# Patient Record
Sex: MALE | Race: ASIAN | NOT HISPANIC OR LATINO | Employment: FULL TIME | URBAN - METROPOLITAN AREA
[De-identification: names, ages, dates, MRNs, and addresses within clinical notes are randomized per-mention and may not be internally consistent; named-entity substitution may affect disease eponyms.]

---

## 2017-06-26 ENCOUNTER — OFFICE VISIT - HEALTHEAST (OUTPATIENT)
Dept: FAMILY MEDICINE | Facility: CLINIC | Age: 22
End: 2017-06-26

## 2017-06-26 DIAGNOSIS — N50.9 TESTICULAR ABNORMALITY: ICD-10-CM

## 2017-06-26 ASSESSMENT — MIFFLIN-ST. JEOR: SCORE: 1334.97

## 2017-06-27 ENCOUNTER — COMMUNICATION - HEALTHEAST (OUTPATIENT)
Dept: FAMILY MEDICINE | Facility: CLINIC | Age: 22
End: 2017-06-27

## 2017-07-05 ENCOUNTER — HOSPITAL ENCOUNTER (OUTPATIENT)
Dept: ULTRASOUND IMAGING | Facility: HOSPITAL | Age: 22
Discharge: HOME | End: 2017-07-05
Attending: FAMILY MEDICINE

## 2017-07-05 DIAGNOSIS — N50.9 TESTICULAR ABNORMALITY: ICD-10-CM

## 2017-07-12 ENCOUNTER — OFFICE VISIT - HEALTHEAST (OUTPATIENT)
Dept: FAMILY MEDICINE | Facility: CLINIC | Age: 22
End: 2017-07-12

## 2017-07-12 DIAGNOSIS — M54.6 BILATERAL THORACIC BACK PAIN, UNSPECIFIED CHRONICITY: ICD-10-CM

## 2017-07-12 ASSESSMENT — MIFFLIN-ST. JEOR: SCORE: 1341.74

## 2018-08-09 ENCOUNTER — OFFICE VISIT - HEALTHEAST (OUTPATIENT)
Dept: FAMILY MEDICINE | Facility: CLINIC | Age: 23
End: 2018-08-09

## 2018-08-09 DIAGNOSIS — Z98.890 H/O RIGHT INGUINAL HERNIA REPAIR: ICD-10-CM

## 2018-08-09 DIAGNOSIS — N50.811 PAIN IN RIGHT TESTICLE: ICD-10-CM

## 2018-08-09 DIAGNOSIS — Z23 IMMUNIZATION DUE: ICD-10-CM

## 2018-08-09 DIAGNOSIS — Z87.19 H/O RIGHT INGUINAL HERNIA REPAIR: ICD-10-CM

## 2018-08-09 ASSESSMENT — MIFFLIN-ST. JEOR: SCORE: 1317.05

## 2018-08-10 ENCOUNTER — HOSPITAL ENCOUNTER (OUTPATIENT)
Dept: ULTRASOUND IMAGING | Facility: HOSPITAL | Age: 23
Discharge: HOME | End: 2018-08-10
Attending: FAMILY MEDICINE

## 2018-08-10 DIAGNOSIS — N50.811 PAIN IN RIGHT TESTICLE: ICD-10-CM

## 2018-08-21 ENCOUNTER — COMMUNICATION - HEALTHEAST (OUTPATIENT)
Dept: FAMILY MEDICINE | Facility: CLINIC | Age: 23
End: 2018-08-21

## 2019-12-04 ENCOUNTER — OFFICE VISIT - HEALTHEAST (OUTPATIENT)
Dept: FAMILY MEDICINE | Facility: CLINIC | Age: 24
End: 2019-12-04

## 2019-12-04 DIAGNOSIS — R10.31 RLQ ABDOMINAL PAIN: ICD-10-CM

## 2019-12-04 DIAGNOSIS — T81.9XXS: ICD-10-CM

## 2019-12-04 DIAGNOSIS — R63.6 UNDERWEIGHT: ICD-10-CM

## 2019-12-04 RX ORDER — ACETAMINOPHEN 500 MG
1000 TABLET ORAL 2 TIMES DAILY PRN
Qty: 100 TABLET | Refills: 2 | Status: SHIPPED | OUTPATIENT
Start: 2019-12-04 | End: 2024-04-05

## 2019-12-04 ASSESSMENT — MIFFLIN-ST. JEOR: SCORE: 1323.52

## 2019-12-05 ENCOUNTER — COMMUNICATION - HEALTHEAST (OUTPATIENT)
Dept: FAMILY MEDICINE | Facility: CLINIC | Age: 24
End: 2019-12-05

## 2020-06-01 ENCOUNTER — VIRTUAL VISIT (OUTPATIENT)
Dept: FAMILY MEDICINE | Facility: OTHER | Age: 25
End: 2020-06-01

## 2020-06-02 NOTE — PROGRESS NOTES
"Date: 2020 08:33:42  Clinician: Luiza Tyson  Clinician NPI: [id number]  Patient: Paul Fulton  Patient : 1995  Patient Address: [address], [place] [address]  Patient Phone: [phone]  Visit Protocol: URI  Patient Summary:  Paul is a 24 year old ( : 1995 ) male who initiated a Visit for COVID-19 (Coronavirus) evaluation and screening. When asked the question \"Please sign me up to receive news, health information and promotions. \", Paul responded \"No\".    Paul states his symptoms started 1-2 days ago.   His symptoms consist of ageusia, a sore throat, malaise, myalgia, anosmia, a cough, nasal congestion, rhinitis, a headache, and chills. Paul also feels feverish.   Symptom details     Nasal secretions: The color of his mucus is yellow.    Cough: Paul coughs every 5-10 minutes and his cough is more bothersome at night. Phlegm does not come into his throat when he coughs. He does not believe his cough is caused by post-nasal drip.     Sore throat: Paul reports having severe throat pain (7-9 on a 10 point pain scale), does not have exudate on his tonsils, and can swallow liquids. He is not sure if the lymph nodes in his neck are enlarged. A rash has not appeared on the skin since the sore throat started.     Temperature: His current temperature is 99.0 degrees Fahrenheit.     Headache: He states the headache is severe (7-9 on a 10 point pain scale).      Paul denies having wheezing, nausea, teeth pain, diarrhea, facial pain or pressure, vomiting, and ear pain. He also denies having recent facial or sinus surgery in the past 60 days, taking antibiotic medication for the symptoms, and having a sinus infection within the past year. He is not experiencing dyspnea.   Precipitating events  Within the past week, Paul has not been exposed to someone with strep throat. He has recently been exposed to someone with influenza. Paul has not been in close contact with any high risk individuals.   Pertinent " COVID-19 (Coronavirus) information  In the past 14 days, Paul has not worked in a congregate living setting.   He does not work or volunteer as healthcare worker or a  and does not work or volunteer in a healthcare facility.   Paul also has not lived in a congregate living setting in the past 14 days. He does not live with a healthcare worker.   Paul has not had a close contact with a laboratory-confirmed COVID-19 patient within 14 days of symptom onset.   Pertinent medical history  Paul needs a return to work/school note.   Weight: 110 lbs   Paul does not smoke or use smokeless tobacco.   Weight: 110 lbs    MEDICATIONS: No current medications, ALLERGIES: NKDA  Clinician Response:  Dear Paul,      Your symptoms show that you may have coronavirus (COVID-19). This illness can cause fever, cough and trouble breathing. Many people get a mild case and get better on their own. Some people can get very sick.  Will I be tested for COVID-19?  Because we have limited testing supplies we are not testing everyone if they are low risk. We are testing if:   You are very ill. For example, you're on chemotherapy, dialysis or home hospice care. (Contact your specialty clinic or program.)   You live in a nursing home or other long-term care facility. (Talk to your nurse manager or medical director.)   You're a health care worker. ([facility] employees Contact our employee health office for testing.)   We are performing limited curbside testing for healthcare/first responders and people with medical problems that put them at increased risk. It does not appear by the [facility] information you submitted that you meet any of these criteria. If there are medical problems that we did not know about, please repeat an [facility] visit and let us know what medical conditions you have.  How can I protect others?  Without a test, we can't know for sure that you have COVID-19. For safety, it's very important to follow these rules.   First, stay home and away from others (self-isolate) until:   You've had no fever---and no medicine that reduces fever---for 3 full days (72 hours). And...    Your other symptoms have gotten better. For example, your cough or breathing has improved. And...   At least 10 days have passed since your symptoms started.   During this time:   Don't go to work, school or anywhere else.    Stay away from others in your home. No hugging, kissing or shaking hands.   Don't let anyone visit.   Cover your mouth and nose with a mask, tissue or wash cloth to avoid spreading germs.   Wash your hands and face often. Use soap and water.   How can I take care of myself?  1.Take Tylenol (acetaminophen) for fever or pain. If you have liver or kidney problems, ask your family doctor if it's okay to take Tylenol.   Adults can take either:    650 mg (two 325 mg pills) every 4 to 6 hours, or...   1,000 mg (two 500 mg pills) every 8 hours as needed.    Note: Don't take more than 3,000 mg in one day.  For children, check the Tylenol bottle for the right dose. The dose is based on the child's age or weight.   2.If you have other health problems (like cancer, heart failure, an organ transplant or severe kidney disease): Call your specialty clinic if you don't feel better in the next 2 days.  3.Know when to call 911: If your breathing is so bad that it keeps you from doing normal activities, call 911 or go to the emergency room. Tell them that you've been staying home and may have COVID-19.  4.Sign up for [employer]. We know it's scary to hear that you might have COVID-19. We want to track your symptoms to make sure you're okay over the next 2 weeks. Please look for an email from [employer]---this is a free, online program that we'll use to keep in touch. To sign up, follow the link in the email. Learn more at [email or web address].  Where can I get more information?  To learn more about COVID-19 and how to care for yourself at  home, please visit the CDC website at https://www.cdc.gov/coronavirus/2019-ncov/about/steps-when-sick.html.  For more options for care at [facility], please visit our website at [email or web address]   If you are interested in becoming part of a [facility] clinic trial related to COVID19 please go to [email or web address] for information, if you qualify.     Diagnosis: Cough  Diagnosis ICD: R05

## 2020-06-30 ENCOUNTER — COMMUNICATION - HEALTHEAST (OUTPATIENT)
Dept: SCHEDULING | Facility: CLINIC | Age: 25
End: 2020-06-30

## 2020-07-01 ENCOUNTER — OFFICE VISIT - HEALTHEAST (OUTPATIENT)
Dept: FAMILY MEDICINE | Facility: CLINIC | Age: 25
End: 2020-07-01

## 2020-07-01 ENCOUNTER — COMMUNICATION - HEALTHEAST (OUTPATIENT)
Dept: FAMILY MEDICINE | Facility: CLINIC | Age: 25
End: 2020-07-01

## 2020-07-01 DIAGNOSIS — M25.512 ACUTE PAIN OF LEFT SHOULDER: ICD-10-CM

## 2020-07-01 ASSESSMENT — MIFFLIN-ST. JEOR: SCORE: 1345.07

## 2021-02-26 ENCOUNTER — OFFICE VISIT - HEALTHEAST (OUTPATIENT)
Dept: FAMILY MEDICINE | Facility: CLINIC | Age: 26
End: 2021-02-26

## 2021-02-26 DIAGNOSIS — K21.9 GASTROESOPHAGEAL REFLUX DISEASE WITHOUT ESOPHAGITIS: ICD-10-CM

## 2021-02-26 DIAGNOSIS — Z23 NEED FOR IMMUNIZATION AGAINST INFLUENZA: ICD-10-CM

## 2021-02-26 RX ORDER — FAMOTIDINE 20 MG/1
20 TABLET, FILM COATED ORAL 2 TIMES DAILY
Qty: 60 TABLET | Refills: 1 | Status: SHIPPED | OUTPATIENT
Start: 2021-02-26 | End: 2022-04-25

## 2021-02-26 ASSESSMENT — MIFFLIN-ST. JEOR: SCORE: 1338.4

## 2021-03-02 ENCOUNTER — AMBULATORY - HEALTHEAST (OUTPATIENT)
Dept: LAB | Facility: CLINIC | Age: 26
End: 2021-03-02

## 2021-03-02 DIAGNOSIS — K21.9 GASTROESOPHAGEAL REFLUX DISEASE WITHOUT ESOPHAGITIS: ICD-10-CM

## 2021-03-04 LAB — H PYLORI AG STL QL IA: POSITIVE

## 2021-05-19 ENCOUNTER — OFFICE VISIT - HEALTHEAST (OUTPATIENT)
Dept: FAMILY MEDICINE | Facility: CLINIC | Age: 26
End: 2021-05-19

## 2021-05-19 DIAGNOSIS — M79.18 MUSCULAR ABDOMINAL PAIN IN RIGHT LOWER QUADRANT: ICD-10-CM

## 2021-05-19 RX ORDER — CELECOXIB 200 MG/1
200 CAPSULE ORAL 2 TIMES DAILY
Qty: 60 CAPSULE | Refills: 2 | Status: SHIPPED | OUTPATIENT
Start: 2021-05-19 | End: 2023-03-03

## 2021-05-31 VITALS — BODY MASS INDEX: 19.14 KG/M2 | WEIGHT: 104 LBS | HEIGHT: 62 IN

## 2021-05-31 VITALS — HEIGHT: 62 IN | BODY MASS INDEX: 18.96 KG/M2 | WEIGHT: 103.04 LBS

## 2021-06-01 VITALS — HEIGHT: 62 IN | BODY MASS INDEX: 18.14 KG/M2 | WEIGHT: 98.56 LBS

## 2021-06-03 VITALS
DIASTOLIC BLOOD PRESSURE: 58 MMHG | RESPIRATION RATE: 16 BRPM | HEART RATE: 68 BPM | SYSTOLIC BLOOD PRESSURE: 96 MMHG | BODY MASS INDEX: 18.63 KG/M2 | WEIGHT: 101.25 LBS | TEMPERATURE: 98.9 F | HEIGHT: 62 IN

## 2021-06-04 VITALS
DIASTOLIC BLOOD PRESSURE: 68 MMHG | SYSTOLIC BLOOD PRESSURE: 96 MMHG | WEIGHT: 102.5 LBS | OXYGEN SATURATION: 97 % | RESPIRATION RATE: 16 BRPM | HEART RATE: 77 BPM | BODY MASS INDEX: 18.16 KG/M2 | TEMPERATURE: 98.4 F | HEIGHT: 63 IN

## 2021-06-04 NOTE — TELEPHONE ENCOUNTER
The pain is not better. Patient notified that Dr. Bassett office will be calling him to set up an evaluation. Thanks.

## 2021-06-04 NOTE — PROGRESS NOTES
OFFICE VISIT NOTE      Assessment & Plan   Paul Fulton is a 24 y.o. male who has burning pain around his hernia repair on the RLQ. He is frustrated not to have been told his u/s results. His overall picture is uncertain to me, but it is correct for him to be seeking relief from the pain!  He has not been taking pain medications. I want to see if he takes an anti-inflammatory if the pain improves at all. If it doesn't, he will need other testing.    Take meds  Self-monitor  Call in 1 week - see how he's doing. Call after 2pm, call 2nd mobile #    Diagnoses and all orders for this visit:    Surgical site reaction, sequela    RLQ abdominal pain  -     ibuprofen (ADVIL,MOTRIN) 800 MG tablet; Take 1 tablet (800 mg total) by mouth 2 (two) times a day as needed for pain.  Dispense: 60 tablet; Refill: 2  -     acetaminophen (TYLENOL EXTRA STRENGTH) 500 MG tablet; Take 2 tablets (1,000 mg total) by mouth 2 (two) times a day as needed for pain.  Dispense: 100 tablet; Refill: 2    Underweight        Netta Yan MD    The following low BMI interventions were performed this visit: weight gain advised          Subjective:   Chief Complaint:  Follow-up (would like results from ultrasound done in 08/2018, continues to have pain)    24 y.o. male.     1) 2-3 weeks of intermittent burning pain in RLQ, in the area of the hernia surgeon done 2013  He has acetaminophen - but that does not help much    He rates the pain at 8-9/10 when it is bad, but he notes the pain is always there to some degree    When the RLQ pain is bad, the right leg becomes weak and cold (he noted this specific part of the complaint at the very end of the appt)    Works as for [employer] - does not lift extremely heavy things  At the time of the appointment, the pain is not very bad    Current Outpatient Medications   Medication Sig     acetaminophen (TYLENOL EXTRA STRENGTH) 500 MG tablet Take 2 tablets (1,000 mg total) by mouth 2 (two) times a day as  "needed for pain.     ibuprofen (ADVIL,MOTRIN) 800 MG tablet Take 1 tablet (800 mg total) by mouth 2 (two) times a day as needed for pain.       PSFHx: appropriate sections of PMH completed/filled in   Tobacco Status:  He  reports that he quit smoking about 3 years ago. His smoking use included cigarettes. He has never used smokeless tobacco.    Review of Systems:  No fever.  No rash. All other systems negative except as noted above.    Objective:    BP 96/58   Pulse 68   Temp 98.9  F (37.2  C) (Oral)   Resp 16   Ht 5' 2\" (1.575 m)   Wt 101 lb 4 oz (45.9 kg)   BMI 18.52 kg/m    GENERAL: No acute distress, calm, pleasant  Abd: +BS, soft, ND/tender in RLQ near the surgical scar which is well-healed, non-erythematous but protrudes some from under the skin (approx 3mm); with palpation on the LLQ he has more pain on the right; no pain in the upper quadrants; no pain with lifting the right leg while he is lying down    Spent 25 min face to face with patient with more the 50% spent in counseling, education and coordination of care and discussing pain mgmt, activity, etc.    "

## 2021-06-04 NOTE — TELEPHONE ENCOUNTER
Please call Paul Fulton on 12/11 after 2pm and ask how his burning pain is, with taking the ibuprofen?    (only say the following if the pain is not better)  If it is not better, then Dr. Indio Bassett, who did the hernia surgery in 2013 will see and examine him. We'll have the surgery dept call him to set up appt.

## 2021-06-05 VITALS
SYSTOLIC BLOOD PRESSURE: 102 MMHG | WEIGHT: 101.75 LBS | HEART RATE: 60 BPM | OXYGEN SATURATION: 99 % | DIASTOLIC BLOOD PRESSURE: 68 MMHG | HEIGHT: 63 IN | TEMPERATURE: 98.4 F | BODY MASS INDEX: 18.03 KG/M2
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
## 2021-06-09 NOTE — TELEPHONE ENCOUNTER
Pt called in states he was had shoulder pain couple of days ago.  The Pt states the pain is gone now.  The caller states his work place ask him to bring a paper from the Dr.  Appointment is made for the Pt.      Andres Antonio RN, [facility] Triage/Med Refill 6/30/2020 3:27 PM

## 2021-06-09 NOTE — PROGRESS NOTES
Assessment: /    Plan:    1. Acute pain of left shoulder         Note written to be off work 6/29 - 7/1.  Continue massage.  Heat.  Call if any problem.      Subjective:    HPI:  Paul Fulton is a 24-year-old male presnting with left shoulder pain.  It is located in the trapezius muscle area.  He awoke with the pain on 6/28.  No injury.  Massage helps.      Review of Systems:  No fever or cough.      Current Outpatient Medications   Medication Sig Dispense Refill     acetaminophen (TYLENOL EXTRA STRENGTH) 500 MG tablet Take 2 tablets (1,000 mg total) by mouth 2 (two) times a day as needed for pain. 100 tablet 2     ibuprofen (ADVIL,MOTRIN) 800 MG tablet Take 1 tablet (800 mg total) by mouth 2 (two) times a day as needed for pain. 60 tablet 2     No current facility-administered medications for this visit.          Objective:    Vitals:    07/01/20 0937   BP: 96/68   Pulse: 77   Resp: 16   Temp: 98.4  F (36.9  C)   SpO2: 97%       Gen:  NAD, VSS  Lungs:  normal  Heart:  normal  Left shoulder is normal except for trapezius tenderness.        ADDITIONAL HISTORY SUMMARIZED (2): None.  DECISION TO OBTAIN EXTRA INFORMATION (1): None.   RADIOLOGY TESTS (1): None.  LABS (1): None.  MEDICINE TESTS (1): None.  INDEPENDENT REVIEW (2 each): None.     Total Data Points: 0

## 2021-06-11 NOTE — PROGRESS NOTES
"  Chief Complaint   Patient presents with     Hernia     Pain         HPI:   Paul Fulton is a 21 y.o. male with  c/o right inguinal hernia recurrence.  Repaired in 2013.  He thinks there is something wrong with the mesh.  He has noted increased bulging around the testicle.  He also c/o back pain.  He stated this started two weeks ago, but chart review shows he was in in 8/2016 with similar c/o  Patient saw surgery who thought he had a hydrocoele and referred to urology.  Urology said normal exam and felt he had an entrapment of ilioiguinal nerve.  They referred him to the pain clinic for an injection, but he was never seen at the pain clinic.  No urinary symptoms.     ROS:  A 12 point comprehensive review of systems was negative except as noted.     Medications:  No current outpatient prescriptions on file prior to visit.     No current facility-administered medications on file prior to visit.          Social History:  Social History   Substance Use Topics     Smoking status: Current Some Day Smoker     Types: Cigarettes     Smokeless tobacco: Not on file      Comment: 1 cigarret ocasinal     Alcohol use 1.2 - 1.8 oz/week     2 - 3 Cans of beer per week      Comment: ones a months.         Physical Exam:   Vitals:    06/26/17 1934   BP: 112/64   Patient Site: Left Arm   Patient Position: Sitting   Cuff Size: Adult Regular   Pulse: 86   Temp: 97.7  F (36.5  C)   TempSrc: Oral   SpO2: 97%   Weight: 103 lb 0.6 oz (46.7 kg)   Height: 5' 2.21\" (1.58 m)       GEN:  NAD  :  Penis normal.  Testes both descended.  Cystic type structure, mildly tender above the right testicle.  Well healed right inguinal scar.  Mild tenderness over this with coughing.  Suggestion of a bulge with coughing.        Assessment/Plan:    1. Testicular abnormality        He has a cystic type structure above the right testicle that apparently wasn't on the exam when seen by urology.  hydrocoele vs, varicocoele vs hernia.  Will send for " ultrasound to better define this.  Follow up after ultrasound for results.      The following portions of the patient's history were reviewed and updated as appropriate: allergies, current medications, past family history, past medical history, past social history, past surgical history and problem list.    Socrates Lombardi MD      6/26/2017

## 2021-06-11 NOTE — PROGRESS NOTES
Assessment: /    Plan:    1. Bilateral thoracic back pain, unspecified chronicity         Obtain a firm mattress, or put the current one on the floor.  Discontinue viewing a screen for 1 hour before bedtime.  Recheck if not improving.  Patient was seen with Shahrzad Guzman.      Subjective:    HPI:  Paul Fulton is a 21-year-old male presenting for follow-up on scrotal ultrasound.  The right epididymis is larger than the left, with no abnormality.    He also notes that he has soreness of the thoracic back muscles when he wakes up.  He has a soft mattress.  He does not do any significant lifting.    He also notes that it is difficult to fall asleep at times.  He does look at a screen up until bedtime.       Review of Systems: No fever, dysuria, cough.      No current outpatient prescriptions on file.     No current facility-administered medications for this visit.          Objective:    Vitals:    07/12/17 1526   BP: 96/60   Pulse: 66   Resp: 18   Temp: 97.8  F (36.6  C)   SpO2: 98%       Gen:  NAD, VSS  Lungs:  normal  Heart:  normal  Back with no midline thoracic or lumbar spinal tenderness.  Mild tenderness of the thoracic paraspinal muscles.        ADDITIONAL HISTORY SUMMARIZED (2): None.  DECISION TO OBTAIN EXTRA INFORMATION (1): None.   RADIOLOGY TESTS (1): Ultrasound.  LABS (1): None.  MEDICINE TESTS (1): None.  INDEPENDENT REVIEW (2 each): None.     Total Data Points: 1

## 2021-06-15 NOTE — PROGRESS NOTES
"    Assessment & Plan     Paul was seen today for abdominal pain.    Diagnoses and all orders for this visit:    Gastroesophageal reflux disease without esophagitis  -     famotidine (PEPCID) 20 MG tablet; Take 1 tablet (20 mg total) by mouth 2 (two) times a day.  -     H. pylori Antigen, Stool(HPSAG); Future    Need for immunization against influenza  -     Cancel: Influenza, Recombinant, Inj, Quadrivalent, PF, 18+YRS  -     Cancel: Influenza,Seasonal,Quad,INJ =/>6months  -     Influenza, Seasonal Quad, PF =/> 6months    Stop ibuprofen.             No follow-ups on file.    Drew Estes MD  [facility]    Subjective   Paul Fulton is 25 y.o. and presents today for the following health issues   HPI     Wakes with abdominal pain.  Takes ibuprofen.  1-2 beers per week  No caffeine        Review of Systems  No fever, cough, melena      Objective    /68 (Patient Site: Left Arm, Patient Position: Sitting, Cuff Size: Adult Small)   Pulse 60   Temp 98.4  F (36.9  C) (Oral)   Ht 5' 2.8\" (1.595 m)   Wt 101 lb 12 oz (46.2 kg)   SpO2 99%   BMI 18.14 kg/m    Body mass index is 18.14 kg/m .  Physical Exam  Heart normal  Lungs normal  Abdomen normal  : uncircumcised, penis and testes normal, right spermatocele, no inguinal hernia or adenopathy                "

## 2021-06-16 PROBLEM — T81.9XXS: Status: ACTIVE | Noted: 2019-12-05

## 2021-06-16 PROBLEM — S62.624B OPEN DISPLACED FRACTURE OF MIDDLE PHALANX OF RIGHT RING FINGER: Status: ACTIVE | Noted: 2017-10-13

## 2021-06-16 PROBLEM — R63.6 UNDERWEIGHT: Status: ACTIVE | Noted: 2019-12-05

## 2021-06-17 NOTE — PROGRESS NOTES
Paul Fulton is a 25 y.o. male who is being evaluated via a billable video visit.      How would you like to obtain your AVS? declined  If dropped from the video visit, the video invitation should be resent by: Text to cell phone: [phone]  Will anyone else be joining your video visit? No      Video Start Time: 930    Assessment & Plan     Muscular abdominal pain in right lower quadrant  Celebrex prescribed for the pain experienced by the patient.  He understands this medication needs to be taken daily have written a work note to see if it will help if he is not pulling pushing or lifting 40 pounds or greater.  He understands if the symptoms continue he will need to be seen.  - celecoxib (CELEBREX) 200 MG capsule  Dispense: 60 capsule; Refill: 2      [id number]}         Giovanni Tomlin MD  [facility]   Paul Fulton is 25 y.o. and presents today for the following health issues complaints of right-sided lower abdominal pain which is worse with any lifting or straining.      Patient 25-year-old male who has had lower abdominal discomfort for several months he is worried that it might be pain similar to a hernia that was repaired many years ago.  He denies any pain at the site of the hernia repair on the right lower abdominal area but says he has a strain that he feels is muscular radiates into the right upper chest when he is lifting or pulling objects his current work involves him to do this many times a day and sometimes he needs to take a break he denies any nausea or vomiting.  He denies any pain at night after work shift.  Bowel movements have been regular appetites been normal.          Review of Systems  Musculoskeletal positive for muscle pain located in the right lower portion of the abdomen with any heavy lifting      Objective       Vitals:  No vitals were obtained today due to virtual visit.    Physical Exam  Musculoskeletal  Patient demonstrated tenderness over the right lower  portion of the rectus sheath  GI no identifiable hernia noted on exam via video phone            Video-Visit Details    Type of service:  Video Visiit    Video End Time (time video stopped): 952  Originating Location (pt. Location): Home    Distant Location (provider location):  [facility]     Platform used for Video Visit: Mamie

## 2021-06-19 NOTE — PROGRESS NOTES
"ASSESMENT AND PLAN:  Diagnoses and all orders for this visit:    Pain in right testicle  -     US Groin Non Vascular Right; Future; Expected date: 8/9/18  -     US Scrotum and Testicles; Future; Expected date: 8/9/18    H/O right inguinal hernia repair  -     US Groin Non Vascular Right; Future; Expected date: 8/9/18    Immunization due  -     HPV vaccine 9 valent 3 dose IM    Further plan after ultrasound results.Will call patient after result.    SUBJECTIVE: Paul Fulton is a 22-year-old male here with right testicle pain and right inguinal pain.  He has a right inguinal hernia repair in 2013.  He was having ongoing pain, was seen here in 2016 due to the pain.  He was referred back to the surgeon.  He saw the surgeon and was told that everything was normal.  He continues to have pain in the right testicle, right inguinal area radiates to the right lower back on and off since the repair, worsening pain in the past 2 weeks.  He noticed right testicle is larger than the left.  He has trouble walking straight at times due to the pain and pulling sensation.    No past medical history on file.  Patient Active Problem List   Diagnosis     Right Inguinal Hernia       Allergies:  No Known Allergies    History   Smoking Status     Former Smoker     Types: Cigarettes     Quit date: 8/31/2016   Smokeless Tobacco     Never Used     Comment: 1 cigarret ocasinal       Review of systems otherwise negative except as listed in HPI.   History   Smoking Status     Former Smoker     Types: Cigarettes     Quit date: 8/31/2016   Smokeless Tobacco     Never Used     Comment: 1 cigarret ocasinal       OBJECTICE: /54 (Patient Site: Left Arm, Patient Position: Sitting, Cuff Size: Adult Regular)  Pulse 80  Temp 98.2  F (36.8  C) (Oral)   Resp 16  Ht 5' 2.36\" (1.584 m)  Wt (!) 98 lb 9 oz (44.7 kg)  BMI 17.82 kg/m2    DATA REVIEWED:  Additional History from Old Records Summarized (2):   Radiology Tests Summarized or Ordered (1): "       GEN-alert,  in no apparent distress.  HEENT-mucous membranes are moist, neck is supple.  CV-regular rate and rhythm with no murmur.   RESP-lungs clear to auscultation .  ABDOMEN- Soft , not tender.  PELVIC/GENITAL- Well healed scar right inguinal area.  No palpable lump in the right inguinal area.  No tenderness on the scar but some tenderness from right testicle to the right groin.  No signs of infection.  Right testicle is larger than left.  Some tenderness on the right testicle.  SKIN-normal    This transcription uses voice recognition software, which may contain typographical errors.        Stanford Rushing   8/9/2018

## 2021-06-21 NOTE — LETTER
Letter by Giovanni Tomlin MD at      Author: Giovanni Tomlin MD Service: -- Author Type: --    Filed:  Encounter Date: 5/19/2021 Status: (Other)         May 19, 2021     Patient: Paul Fulton   YOB: 1995   Date of Visit: 5/19/2021       To Whom It May Concern:    It is my medical opinion that Paul Fulton should not lift, pull, carry weight greater than 40 pounds for duration of 2 weeks    If you have any questions or concerns, please don't hesitate to call.    Sincerely,        Electronically signed by Giovanni Tomlin MD

## 2021-08-02 ENCOUNTER — NURSE TRIAGE (OUTPATIENT)
Dept: NURSING | Facility: CLINIC | Age: 26
End: 2021-08-02

## 2021-08-04 ENCOUNTER — OFFICE VISIT (OUTPATIENT)
Dept: FAMILY MEDICINE | Facility: CLINIC | Age: 26
End: 2021-08-04
Payer: COMMERCIAL

## 2021-08-04 VITALS
SYSTOLIC BLOOD PRESSURE: 92 MMHG | BODY MASS INDEX: 17.6 KG/M2 | TEMPERATURE: 98.4 F | WEIGHT: 99.31 LBS | OXYGEN SATURATION: 98 % | HEART RATE: 108 BPM | DIASTOLIC BLOOD PRESSURE: 58 MMHG | HEIGHT: 63 IN | RESPIRATION RATE: 16 BRPM

## 2021-08-04 DIAGNOSIS — M54.6 ACUTE RIGHT-SIDED THORACIC BACK PAIN: ICD-10-CM

## 2021-08-04 DIAGNOSIS — R53.83 FATIGUE, UNSPECIFIED TYPE: ICD-10-CM

## 2021-08-04 DIAGNOSIS — R30.0 DYSURIA: ICD-10-CM

## 2021-08-04 DIAGNOSIS — K21.9 GASTROESOPHAGEAL REFLUX DISEASE WITHOUT ESOPHAGITIS: ICD-10-CM

## 2021-08-04 DIAGNOSIS — R07.89 CHEST WALL PAIN: Primary | ICD-10-CM

## 2021-08-04 LAB
ALBUMIN UR-MCNC: NEGATIVE MG/DL
ANION GAP SERPL CALCULATED.3IONS-SCNC: 9 MMOL/L (ref 5–18)
APPEARANCE UR: CLEAR
BASOPHILS # BLD AUTO: 0 10E3/UL (ref 0–0.2)
BASOPHILS NFR BLD AUTO: 0 %
BILIRUB UR QL STRIP: NEGATIVE
BUN SERPL-MCNC: 12 MG/DL (ref 8–22)
CALCIUM SERPL-MCNC: 10.7 MG/DL (ref 8.5–10.5)
CHLORIDE BLD-SCNC: 101 MMOL/L (ref 98–107)
CO2 SERPL-SCNC: 31 MMOL/L (ref 22–31)
COLOR UR AUTO: YELLOW
CREAT SERPL-MCNC: 0.95 MG/DL (ref 0.7–1.3)
EOSINOPHIL # BLD AUTO: 0.2 10E3/UL (ref 0–0.7)
EOSINOPHIL NFR BLD AUTO: 2 %
ERYTHROCYTE [DISTWIDTH] IN BLOOD BY AUTOMATED COUNT: 11.6 % (ref 10–15)
GFR SERPL CREATININE-BSD FRML MDRD: >90 ML/MIN/1.73M2
GLUCOSE BLD-MCNC: 81 MG/DL (ref 70–125)
GLUCOSE UR STRIP-MCNC: NEGATIVE MG/DL
HCT VFR BLD AUTO: 49.9 % (ref 40–53)
HGB BLD-MCNC: 16.7 G/DL (ref 13.3–17.7)
HGB UR QL STRIP: NEGATIVE
IMM GRANULOCYTES # BLD: 0 10E3/UL
IMM GRANULOCYTES NFR BLD: 0 %
KETONES UR STRIP-MCNC: ABNORMAL MG/DL
LEUKOCYTE ESTERASE UR QL STRIP: NEGATIVE
LYMPHOCYTES # BLD AUTO: 1.5 10E3/UL (ref 0.8–5.3)
LYMPHOCYTES NFR BLD AUTO: 24 %
MCH RBC QN AUTO: 30.3 PG (ref 26.5–33)
MCHC RBC AUTO-ENTMCNC: 33.5 G/DL (ref 31.5–36.5)
MCV RBC AUTO: 91 FL (ref 78–100)
MONOCYTES # BLD AUTO: 0.6 10E3/UL (ref 0–1.3)
MONOCYTES NFR BLD AUTO: 9 %
NEUTROPHILS # BLD AUTO: 3.9 10E3/UL (ref 1.6–8.3)
NEUTROPHILS NFR BLD AUTO: 64 %
NITRATE UR QL: NEGATIVE
PH UR STRIP: 6 [PH] (ref 5–7)
PLATELET # BLD AUTO: 209 10E3/UL (ref 150–450)
POTASSIUM BLD-SCNC: 4.1 MMOL/L (ref 3.5–5)
RBC # BLD AUTO: 5.51 10E6/UL (ref 4.4–5.9)
SODIUM SERPL-SCNC: 141 MMOL/L (ref 136–145)
SP GR UR STRIP: 1.02 (ref 1–1.03)
UROBILINOGEN UR STRIP-ACNC: 0.2 E.U./DL
WBC # BLD AUTO: 6.2 10E3/UL (ref 4–11)

## 2021-08-04 PROCEDURE — 93005 ELECTROCARDIOGRAM TRACING: CPT | Performed by: FAMILY MEDICINE

## 2021-08-04 PROCEDURE — 85025 COMPLETE CBC W/AUTO DIFF WBC: CPT | Performed by: FAMILY MEDICINE

## 2021-08-04 PROCEDURE — 93010 ELECTROCARDIOGRAM REPORT: CPT | Performed by: GENERAL ACUTE CARE HOSPITAL

## 2021-08-04 PROCEDURE — 36415 COLL VENOUS BLD VENIPUNCTURE: CPT | Performed by: FAMILY MEDICINE

## 2021-08-04 PROCEDURE — 80048 BASIC METABOLIC PNL TOTAL CA: CPT | Performed by: FAMILY MEDICINE

## 2021-08-04 PROCEDURE — 99215 OFFICE O/P EST HI 40 MIN: CPT | Performed by: FAMILY MEDICINE

## 2021-08-04 PROCEDURE — 81003 URINALYSIS AUTO W/O SCOPE: CPT | Performed by: FAMILY MEDICINE

## 2021-08-04 RX ORDER — FAMOTIDINE 40 MG/1
40 TABLET, FILM COATED ORAL DAILY
Qty: 30 TABLET | Refills: 3 | Status: SHIPPED | OUTPATIENT
Start: 2021-08-04 | End: 2023-03-03

## 2021-08-04 ASSESSMENT — MIFFLIN-ST. JEOR: SCORE: 1324.22

## 2021-08-04 NOTE — LETTER
[address]  [place] [address]  Phone: [phone]  Fax: [phone]    August 4, 2021        Paul Fulton  [address] [place] [address]          To whom it may concern:    RE: Paul Fulton    Patient was seen and treated today at our clinic.  Please excuse his absence from work on 08/02/2021 to  08/06/2021.      Please contact me for questions or concerns.      Sincerely,        Giovanni Tomlin MD

## 2021-08-04 NOTE — PROGRESS NOTES
ASSESSMENT and plan   1. Chest wall pain  Patient has been having constant chest pain which radiates from the right to the left side of his chest wall.  Worsened with exertion.  Today's chest x-ray showed no abnormalities pulmonary spaces are well preserved there is no evidence of cardiomegaly results discussed with patient.  EKG shows normal sinus rhythm.  Hemogram is unremarkable.  Naproxen started for discomfort he was unable to  the Mobic which was prescribed 3 months ago.  - XR Chest 2 Views; Future  - EKG 12-lead, tracing only  - **CBC with platelets differential FUTURE 2mo; Future  - **CBC with platelets differential FUTURE 2mo  - naproxen (NAPROSYN) 375 MG tablet; Take 1 tablet (375 mg) by mouth 2 times daily (with meals)  Dispense: 60 tablet; Refill: 3    2. Acute right-sided thoracic back pain    Chest wall pain is present which radiates back to his thoracic pain is worse when he flexes his thoracic spine.  - XR Chest 2 Views; Future    3. Dysuria    Increase fluid intake recommended urinalysis is unremarkable  - UA Macro with Reflex to Micro and Culture - lab collect; Future  - UA Macro with Reflex to Micro and Culture - lab collect    4. Fatigue, unspecified type    Patient claims he is fatigued hemogram today shows no evidence of anemia.  He does mention that his sleep is been disturbed and this could be causing the problem with his fatigue.  - **CBC with platelets differential FUTURE 2mo; Future  - Basic metabolic panel  (Ca, Cl, CO2, Creat, Gluc, K, Na, BUN); Future  - **CBC with platelets differential FUTURE 2mo  - Basic metabolic panel  (Ca, Cl, CO2, Creat, Gluc, K, Na, BUN)    5. Gastroesophageal reflux disease without esophagitis  Has epigastric discomfort noted intolerance of spicy food he has radiating epigastric discomfort which radiates into the left chest wall Pepcid to be started.  - famotidine (PEPCID) 40 MG tablet; Take 1 tablet (40 mg) by mouth daily  Dispense: 30 tablet; Refill:  3    There are no Patient Instructions on file for this visit.    Orders Placed This Encounter   Procedures     XR Chest 2 Views     UA Macro with Reflex to Micro and Culture - lab collect     Basic metabolic panel  (Ca, Cl, CO2, Creat, Gluc, K, Na, BUN)     CBC with platelets and differential     EKG 12-lead, tracing only     There are no discontinued medications.    Return in about 3 months (around 11/4/2021) for gastritis.    CHIEF COMPLAINT:  chief complaint    HISTORY OF PRESENT ILLNESS:  Paul is a 25 year old male who is here today because of a 2-week history of left and right-sided chest wall pain which started suddenly he said the pains been there continuously its worse and is associated with sweating and shortness of breath when he walks he has been off work since Monday because of the discomfort.  He says he feels tired and has not been able to sleep well he says he has pain that radiates from his right upper back into his chest.  He also mentions that he has been having some burning when he urinates says his appetite is poor still has epigastric or abdominal burning can eat spicy food denies any dizziness visual complaints or cough.  Has not had a fever.  No one at home has similar symptoms.  He states that he could not  the medications prescribed to him in April.    REVIEW OF SYSTEMS:   Musculoskeletal positive for upper back pain, positive for right-sided chest and left-sided chest wall pain  Endocrine positive for diaphoresis   pulmonary positive for shortness of breath   positive for burning when he urinates  GIT positive for abdominal burning  All other systems are negative.    PFSH:    Social history reviewed    TOBACCO USE:  History   Smoking Status     Former Smoker     Types: Cigarettes     Quit date: 8/31/2016   Smokeless Tobacco     Current User     Comment: Betel nut w/o tobacco       VITALS:  Vitals:    08/04/21 0924   BP: 92/58   Pulse: 108   Resp: 16   Temp: 98.4  F (36.9  C)  "  TempSrc: Oral   SpO2: 98%   Weight: 45 kg (99 lb 5 oz)   Height: 1.59 m (5' 2.6\")     Wt Readings from Last 3 Encounters:   08/04/21 45 kg (99 lb 5 oz)   02/26/21 46.2 kg (101 lb 12 oz)   07/01/20 46.5 kg (102 lb 8 oz)       PHYSICAL EXAM:  Interactive male leaning forward on exam table appears to be in mild distress  HEENT neck supple mucous members moist oral cavity shows no exudate no erythema no lymph enlargement noted  Musculoskeletal system tenderness elicited when I palpate the thoracic spine specifically at T1 and T2.  Forward flexion normal thoracic spine at 40 degrees of flexion and 20 degrees extension.  CVS he is tachycardic no rubs or gallops murmurs detected no pedal edema regular rhythm  Respiratory system clear to auscultation equal breath sounds no wheezes no crackles  Abdomen tenderness in the epigastrium no hepatosplenomegaly bowel sounds are present no associated masses.  No rebound tenderness  CNS cranial nerves II to XII intact gait is normal reflexes are brisk      DATA REVIEWED:  Additional History from Old Records Summarized (2): 0  Decision to Obtain Records (1): 0  Radiology Tests Summarized or Ordered (1): 0  Labs Reviewed or Ordered (1): 0  Medicine Test Summarized or Ordered (1): 0  Independent Review of EKG or X-RAY(2 each): 0    The visit lasted a total of 40 minutes    MEDICATIONS:  Current Outpatient Medications   Medication Sig Dispense Refill     acetaminophen (TYLENOL EXTRA STRENGTH) 500 MG tablet [ACETAMINOPHEN (TYLENOL EXTRA STRENGTH) 500 MG TABLET] Take 2 tablets (1,000 mg total) by mouth 2 (two) times a day as needed for pain. 100 tablet 2     famotidine (PEPCID) 40 MG tablet Take 1 tablet (40 mg) by mouth daily 30 tablet 3     naproxen (NAPROSYN) 375 MG tablet Take 1 tablet (375 mg) by mouth 2 times daily (with meals) 60 tablet 3     celecoxib (CELEBREX) 200 MG capsule [CELECOXIB (CELEBREX) 200 MG CAPSULE] Take 1 capsule (200 mg total) by mouth 2 (two) times a day. " (Patient not taking: Reported on 8/4/2021) 60 capsule 2     famotidine (PEPCID) 20 MG tablet [FAMOTIDINE (PEPCID) 20 MG TABLET] Take 1 tablet (20 mg total) by mouth 2 (two) times a day. (Patient not taking: Reported on 8/4/2021) 60 tablet 1

## 2021-08-11 LAB
ATRIAL RATE - MUSE: 71 BPM
DIASTOLIC BLOOD PRESSURE - MUSE: NORMAL MMHG
INTERPRETATION ECG - MUSE: NORMAL
P AXIS - MUSE: 65 DEGREES
PR INTERVAL - MUSE: 154 MS
QRS DURATION - MUSE: 76 MS
QT - MUSE: 352 MS
QTC - MUSE: 382 MS
R AXIS - MUSE: 86 DEGREES
SYSTOLIC BLOOD PRESSURE - MUSE: NORMAL MMHG
T AXIS - MUSE: 73 DEGREES
VENTRICULAR RATE- MUSE: 71 BPM

## 2021-08-21 NOTE — TELEPHONE ENCOUNTER
"Having back pain and chest pain for two weeks.  He says his chest pain is 9/10.  He is wanting to make appointment with his PCP to get a \"full body scan.\"    Recommended for severe chest pain patient should be seen in ER per triage guidelines.  Patient verbalized understanding but requested scheduling so he could make appointment.  Transferred to scheduling.    Liliya Berry RN  [facility]        Reason for Disposition    SEVERE chest pain    Additional Information    Negative: Severe difficulty breathing (e.g., struggling for each breath, speaks in single words)    Negative: Passed out (i.e., fainted, collapsed and was not responding)    Negative: Difficult to awaken or acting confused (e.g., disoriented, slurred speech)    Negative: Shock suspected (e.g., cold/pale/clammy skin, too weak to stand, low BP, rapid pulse)    Negative: Chest pain lasting longer than 5 minutes and ANY of the following:* Over 45 years old* Over 30 years old and at least one cardiac risk factor (e.g., diabetes, high blood pressure, high cholesterol, smoker, or strong family history of heart disease)* History of heart disease (i.e., angina, heart attack, heart failure, bypass surgery, takes nitroglycerin)* Pain is crushing, pressure-like, or heavy    Negative: Heart beating < 50 beats per minute OR > 140 beats per minute    Negative: Visible sweat on face or sweat dripping down face    Negative: Sounds like a life-threatening emergency to the triager    Negative: Followed an injury to chest    Protocols used: CHEST PAIN-A-OH      "
unk

## 2022-04-25 ENCOUNTER — OFFICE VISIT (OUTPATIENT)
Dept: FAMILY MEDICINE | Facility: CLINIC | Age: 27
End: 2022-04-25
Payer: COMMERCIAL

## 2022-04-25 VITALS
SYSTOLIC BLOOD PRESSURE: 102 MMHG | HEART RATE: 70 BPM | BODY MASS INDEX: 18.73 KG/M2 | OXYGEN SATURATION: 97 % | TEMPERATURE: 98.1 F | DIASTOLIC BLOOD PRESSURE: 60 MMHG | WEIGHT: 104.4 LBS

## 2022-04-25 DIAGNOSIS — R07.89 CHEST WALL PAIN: ICD-10-CM

## 2022-04-25 DIAGNOSIS — M54.6 ACUTE BILATERAL THORACIC BACK PAIN: Primary | ICD-10-CM

## 2022-04-25 PROBLEM — S62.624B OPEN DISPLACED FRACTURE OF MIDDLE PHALANX OF RIGHT RING FINGER: Status: RESOLVED | Noted: 2017-10-13 | Resolved: 2022-04-25

## 2022-04-25 PROCEDURE — 99213 OFFICE O/P EST LOW 20 MIN: CPT | Performed by: FAMILY MEDICINE

## 2022-04-25 NOTE — PROGRESS NOTES
OUTPATIENT VISIT NOTE                                                   Date of Visit: 2022     Chief Complaint   Patient presents with:  Back Pain: No injury    Chest Pain: Pain radiating from back            History of Present Illness   Paul Fulton is a 26 year old male with  by phone c/o pain in the back for the last 5-6 months.  Seems to be getting worse.  Located on either side of spine.  Radiates to front of upper chest on the right.  No known injury.  No trouble breathing.  Uses tylenol --not much help.  Pain is constant.         MEDICATIONS   Current Outpatient Medications   Medication     acetaminophen (TYLENOL EXTRA STRENGTH) 500 MG tablet     celecoxib (CELEBREX) 200 MG capsule     famotidine (PEPCID) 40 MG tablet     naproxen (NAPROSYN) 375 MG tablet     No current facility-administered medications for this visit.         SOCIAL HISTORY   Social History     Tobacco Use     Smoking status: Former Smoker     Types: Cigarettes     Quit date: 2016     Years since quittin.6     Smokeless tobacco: Current User     Tobacco comment: Betel nut w/o tobacco   Substance Use Topics     Alcohol use: Yes     Alcohol/week: 2.0 - 3.0 standard drinks     Comment: Alcoholic Drinks/day: ones a months.           Physical Exam   Vitals:    22 0944   BP: 102/60   Cuff Size: Adult Small   Pulse: 70   Temp: 98.1  F (36.7  C)   SpO2: 97%   Weight: 47.4 kg (104 lb 6.4 oz)        GEN:  NAD  LUNGS:  Clear to auscultation without wheezing.  Normal effort.  HEART:  RRR without murmur, rub or gallop   BACK:  Tender over bilateral paraspinous muscles in thorax and right trapezius.   Chest wall: tender over right upper chest wall         Assessment and Plan     Acute bilateral thoracic back pain  - naproxen (NAPROSYN) 375 MG tablet  Dispense: 60 tablet; Refill: 3    Chest wall pain             Naproxen as directed.  Ice to area several times daily.  Heat in shower.  Given instructions for upper back  stretches.  Recheck if not improving.      Discussed signs / symptoms that warrant urgent / emergent medical attention.     Recheck if worsening or not improving.       Socrates Lombardi MD          Pertinent History     The following portions of the patient's history were reviewed and updated as appropriate: allergies, current medications, past family history, past medical history, past social history, past surgical history and problem list.

## 2022-04-25 NOTE — PATIENT INSTRUCTIONS
Put ice on your back for 15 minutes 2-3 times a day.   hot shower once a day for 15 minutes with hot water on your back.    Take the naproxen 1 pill in the morning and in the evening for 2 weeks, then you can take the pill every twelve hours as needed for pain.            UPPER BACK EXERCISES     Shoulder and Upper Back Stretch  To start, stand tall with your ears, shoulders, and hips in line. Your feet should be slightly apart, positioned just under your hips. Focus your eyes directly in front of you.  this position for a few seconds before starting your exercise. This helps increase your awareness of proper posture.  Reach overhead and slightly back with both arms. Keep your shoulders and neck aligned and your elbows behind your shoulders.  With your palms facing the ceiling, turn your fingers inward.  Take a deep breath. Breathe out and lower your elbows toward your buttocks. Hold for 5 seconds, then return to starting position.  Repeat 3 times.           Shoulder Girdle Stretch      To start, sit in a chair with your feet flat on the floor. Your weight should be slightly forward so that you re balanced evenly on your buttocks. Relax your shoulders and keep your head level. Using a chair with arms may help you keep your balance.  Place one hand on the outside elbow of the other arm.  Pull the arm across your body. Hold for 30-60 seconds. Repeat once.  Switch sides.        Shoulder Shrug Exercise  To start, sit in a chair with your feet flat on the floor. Shift your weight slightly forward to avoid rounding your back. Relax. Keep your ears, shoulders, and hips aligned.  Raise both of your shoulders as high as you can, as if you were trying to touch them to your ears. Keep your head and neck still and relaxed.  Hold for a count of 10. Release.  Repeat 5 times.        Shoulder Squeeze Exercise      To start, sit in a chair with your feet flat on the floor. Shift your weight slightly forward to avoid  rounding your back. Relax. Keep your ears, shoulders, and hips aligned.  Raise your arms to shoulder height, elbows bent and palms forward.  Move your arms back, squeezing your shoulder blades together.  Hold for 10 seconds. Return to starting position.   Repeat 5 times.

## 2023-03-03 ENCOUNTER — OFFICE VISIT (OUTPATIENT)
Dept: FAMILY MEDICINE | Facility: CLINIC | Age: 28
End: 2023-03-03
Payer: COMMERCIAL

## 2023-03-03 VITALS
TEMPERATURE: 97.9 F | DIASTOLIC BLOOD PRESSURE: 66 MMHG | HEART RATE: 64 BPM | WEIGHT: 101.5 LBS | SYSTOLIC BLOOD PRESSURE: 100 MMHG | OXYGEN SATURATION: 98 % | BODY MASS INDEX: 17.98 KG/M2 | HEIGHT: 63 IN | RESPIRATION RATE: 16 BRPM

## 2023-03-03 DIAGNOSIS — A04.8 H. PYLORI INFECTION: ICD-10-CM

## 2023-03-03 DIAGNOSIS — M54.89 OTHER BACK PAIN, UNSPECIFIED CHRONICITY: Primary | ICD-10-CM

## 2023-03-03 PROCEDURE — 99214 OFFICE O/P EST MOD 30 MIN: CPT | Performed by: FAMILY MEDICINE

## 2023-03-03 RX ORDER — AMOXICILLIN 500 MG/1
1000 CAPSULE ORAL 2 TIMES DAILY
Qty: 56 CAPSULE | Refills: 0 | Status: SHIPPED | OUTPATIENT
Start: 2023-03-03 | End: 2023-03-17

## 2023-03-03 RX ORDER — METRONIDAZOLE 500 MG/1
500 TABLET ORAL 2 TIMES DAILY
Qty: 28 TABLET | Refills: 0 | Status: SHIPPED | OUTPATIENT
Start: 2023-03-03 | End: 2023-03-17

## 2023-03-03 RX ORDER — CLARITHROMYCIN 500 MG
500 TABLET ORAL 2 TIMES DAILY
Qty: 28 TABLET | Refills: 0 | Status: SHIPPED | OUTPATIENT
Start: 2023-03-03 | End: 2023-03-17

## 2023-03-03 RX ORDER — CYCLOBENZAPRINE HCL 5 MG
5-10 TABLET ORAL
Qty: 30 TABLET | Refills: 3 | Status: SHIPPED | OUTPATIENT
Start: 2023-03-03 | End: 2024-04-05

## 2023-03-03 ASSESSMENT — PATIENT HEALTH QUESTIONNAIRE - PHQ9
SUM OF ALL RESPONSES TO PHQ QUESTIONS 1-9: 2
10. IF YOU CHECKED OFF ANY PROBLEMS, HOW DIFFICULT HAVE THESE PROBLEMS MADE IT FOR YOU TO DO YOUR WORK, TAKE CARE OF THINGS AT HOME, OR GET ALONG WITH OTHER PEOPLE: NOT DIFFICULT AT ALL
SUM OF ALL RESPONSES TO PHQ QUESTIONS 1-9: 2

## 2023-03-03 ASSESSMENT — ENCOUNTER SYMPTOMS: BACK PAIN: 1

## 2023-03-03 NOTE — PROGRESS NOTES
Assessment & Plan     Other back pain, unspecified chronicity    Not controlled by tylenol.  Poor sleep due to back pain.  Begin muscle relaxer at bedtime.    - cyclobenzaprine (FLEXERIL) 5 MG tablet  Dispense: 30 tablet; Refill: 3    H. pylori infection    Treat with concomitant therapy, which has higher cure rate.    - amoxicillin (AMOXIL) 500 MG capsule  Dispense: 56 capsule; Refill: 0  - clarithromycin (BIAXIN) 500 MG tablet  Dispense: 28 tablet; Refill: 0  - metroNIDAZOLE (FLAGYL) 500 MG tablet  Dispense: 28 tablet; Refill: 0  - omeprazole (PRILOSEC) 20 MG DR capsule  Dispense: 28 capsule; Refill: 0      Prescription drug management     30 minutes spent on the date of the encounter doing chart review, review of test results and patient visit            No follow-ups on file.    Drew Estes MD  [facility] ELMO Miller is a 27 year old, presenting for the following health issues:  Back Pain, Abdominal Pain, and Musculoskeletal Problem      Back Pain     Musculoskeletal Problem    History of Present Illness       Reason for visit:  Back pain    He eats 0-1 servings of fruits and vegetables daily.He consumes 1 sweetened beverage(s) daily.He exercises with enough effort to increase his heart rate 9 or less minutes per day.  He exercises with enough effort to increase his heart rate 3 or less days per week.   He is taking medications regularly.    Today's PHQ-9         PHQ-9 Total Score: 2    PHQ-9 Q9 Thoughts of better off dead/self-harm past 2 weeks :   Not at all    How difficult have these problems made it for you to do your work, take care of things at home, or get along with other people: Not difficult at all       Bilateral Trapezius muscle pain, has poor sleep as a result.  Takes tylenol.    Abdominal pain after eating.  H pylori was positive.    Current Outpatient Medications   Medication Sig Dispense Refill     amoxicillin (AMOXIL) 500 MG capsule Take 2 capsules (1,000  "mg) by mouth 2 times daily for 14 days 56 capsule 0     clarithromycin (BIAXIN) 500 MG tablet Take 1 tablet (500 mg) by mouth 2 times daily for 14 days 28 tablet 0     cyclobenzaprine (FLEXERIL) 5 MG tablet Take 1-2 tablets (5-10 mg) by mouth nightly as needed for muscle spasms 30 tablet 3     metroNIDAZOLE (FLAGYL) 500 MG tablet Take 1 tablet (500 mg) by mouth 2 times daily for 14 days 28 tablet 0     omeprazole (PRILOSEC) 20 MG DR capsule Take 1 capsule (20 mg) by mouth daily for 28 days 28 capsule 0     acetaminophen (TYLENOL EXTRA STRENGTH) 500 MG tablet [ACETAMINOPHEN (TYLENOL EXTRA STRENGTH) 500 MG TABLET] Take 2 tablets (1,000 mg total) by mouth 2 (two) times a day as needed for pain. (Patient not taking: Reported on 3/3/2023) 100 tablet 2         Review of Systems   Musculoskeletal: Positive for back pain.            Objective    /66   Pulse 64   Temp 97.9  F (36.6  C) (Oral)   Resp 16   Ht 1.588 m (5' 2.5\")   Wt 46 kg (101 lb 8 oz)   SpO2 98%   BMI 18.27 kg/m    Body mass index is 18.27 kg/m .  Physical Exam   Heart normal  Lungs normal  Abdomen normal  Back: tender trapezius bilateral                    "

## 2023-05-05 ENCOUNTER — OFFICE VISIT (OUTPATIENT)
Dept: FAMILY MEDICINE | Facility: CLINIC | Age: 28
End: 2023-05-05
Payer: COMMERCIAL

## 2023-05-05 VITALS
HEART RATE: 104 BPM | TEMPERATURE: 97.1 F | WEIGHT: 98.75 LBS | RESPIRATION RATE: 16 BRPM | BODY MASS INDEX: 17.5 KG/M2 | OXYGEN SATURATION: 97 % | SYSTOLIC BLOOD PRESSURE: 93 MMHG | DIASTOLIC BLOOD PRESSURE: 58 MMHG | HEIGHT: 63 IN

## 2023-05-05 DIAGNOSIS — K21.9 GASTROESOPHAGEAL REFLUX DISEASE WITHOUT ESOPHAGITIS: Primary | ICD-10-CM

## 2023-05-05 PROCEDURE — 99213 OFFICE O/P EST LOW 20 MIN: CPT | Performed by: FAMILY MEDICINE

## 2023-05-05 RX ORDER — FAMOTIDINE 20 MG/1
20 TABLET, FILM COATED ORAL 2 TIMES DAILY
Qty: 60 TABLET | Refills: 11 | Status: SHIPPED | OUTPATIENT
Start: 2023-05-05 | End: 2024-04-05

## 2023-05-05 NOTE — PROGRESS NOTES
Assessment & Plan     Gastroesophageal reflux disease without esophagitis    Improved.    - famotidine (PEPCID) 20 MG tablet  Dispense: 60 tablet; Refill: 11                   Drew Estes MD  [facility] ELMO Miller is a 27 year old, presenting for the following health issues:  Follow Up (Medication)        5/5/2023     2:51 PM   Additional Questions   Roomed by Meka Martins MA   Accompanied by SELF     History of Present Illness       Reason for visit:  Having a stomach pain    He eats 4 or more servings of fruits and vegetables daily.He consumes 1 sweetened beverage(s) daily.He exercises with enough effort to increase his heart rate 9 or less minutes per day.  He exercises with enough effort to increase his heart rate 4 days per week.   He is taking medications regularly.       Abdominal symptoms got better after H pylori treatment.  He occasionally has 30 minutes of epigastric burning.    He does not use alcohol.  He used a lot in [id number].    Current Outpatient Medications   Medication Sig Dispense Refill     cyclobenzaprine (FLEXERIL) 5 MG tablet Take 1-2 tablets (5-10 mg) by mouth nightly as needed for muscle spasms 30 tablet 3     famotidine (PEPCID) 20 MG tablet Take 1 tablet (20 mg) by mouth 2 times daily 60 tablet 11     acetaminophen (TYLENOL EXTRA STRENGTH) 500 MG tablet [ACETAMINOPHEN (TYLENOL EXTRA STRENGTH) 500 MG TABLET] Take 2 tablets (1,000 mg total) by mouth 2 (two) times a day as needed for pain. (Patient not taking: Reported on 3/3/2023) 100 tablet 2           Review of Systems   No fever or melena.      Objective    There were no vitals taken for this visit.  There is no height or weight on file to calculate BMI.  Physical Exam   Heart normal  Lungs normal  Abdomen normal

## 2024-04-05 ENCOUNTER — OFFICE VISIT (OUTPATIENT)
Dept: FAMILY MEDICINE | Facility: CLINIC | Age: 29
End: 2024-04-05
Payer: COMMERCIAL

## 2024-04-05 VITALS
HEIGHT: 63 IN | TEMPERATURE: 99 F | RESPIRATION RATE: 18 BRPM | BODY MASS INDEX: 17.73 KG/M2 | WEIGHT: 100.04 LBS | OXYGEN SATURATION: 98 % | SYSTOLIC BLOOD PRESSURE: 100 MMHG | HEART RATE: 82 BPM | DIASTOLIC BLOOD PRESSURE: 67 MMHG

## 2024-04-05 DIAGNOSIS — M54.89 OTHER BACK PAIN, UNSPECIFIED CHRONICITY: ICD-10-CM

## 2024-04-05 DIAGNOSIS — R52 PAIN: ICD-10-CM

## 2024-04-05 DIAGNOSIS — K21.9 GASTROESOPHAGEAL REFLUX DISEASE WITHOUT ESOPHAGITIS: Primary | ICD-10-CM

## 2024-04-05 PROCEDURE — 99214 OFFICE O/P EST MOD 30 MIN: CPT | Performed by: FAMILY MEDICINE

## 2024-04-05 RX ORDER — CYCLOBENZAPRINE HCL 5 MG
5-10 TABLET ORAL
Qty: 30 TABLET | Refills: 3 | Status: SHIPPED | OUTPATIENT
Start: 2024-04-05

## 2024-04-05 RX ORDER — ACETAMINOPHEN 500 MG
1000 TABLET ORAL 2 TIMES DAILY PRN
Qty: 100 TABLET | Refills: 11 | Status: SHIPPED | OUTPATIENT
Start: 2024-04-05

## 2024-04-05 RX ORDER — FAMOTIDINE 20 MG/1
20 TABLET, FILM COATED ORAL 2 TIMES DAILY
Qty: 60 TABLET | Refills: 11 | Status: SHIPPED | OUTPATIENT
Start: 2024-04-05

## 2024-04-05 ASSESSMENT — PATIENT HEALTH QUESTIONNAIRE - PHQ9
SUM OF ALL RESPONSES TO PHQ QUESTIONS 1-9: 10
SUM OF ALL RESPONSES TO PHQ QUESTIONS 1-9: 10
10. IF YOU CHECKED OFF ANY PROBLEMS, HOW DIFFICULT HAVE THESE PROBLEMS MADE IT FOR YOU TO DO YOUR WORK, TAKE CARE OF THINGS AT HOME, OR GET ALONG WITH OTHER PEOPLE: SOMEWHAT DIFFICULT

## 2024-04-05 NOTE — PROGRESS NOTES
"  Assessment & Plan     Gastroesophageal reflux disease without esophagitis    Not controlled.    - famotidine (PEPCID) 20 MG tablet  Dispense: 60 tablet; Refill: 11  - Helicobacter pylori Antigen Stool  - Helicobacter pylori Antigen Stool    Other back pain, unspecified chronicity    Not controlled.    - cyclobenzaprine (FLEXERIL) 5 MG tablet  Dispense: 30 tablet; Refill: 3    Pain    - acetaminophen (TYLENOL) 500 MG tablet  Dispense: 100 tablet; Refill: 11      Prescription drug management              Mata Miller is a 28 year old, presenting for the following health issues:  Abdominal Pain (Patient has stomach pain, unable to sleep, plus he concerns about sleep apnea. ) and back sore       4/5/2024     2:35 PM   Additional Questions   Roomed by portillo boss   Accompanied by self     History of Present Illness       Reason for visit:  Stomach promble    He eats 0-1 servings of fruits and vegetables daily.He consumes 0 sweetened beverage(s) daily.He exercises with enough effort to increase his heart rate 9 or less minutes per day.  He exercises with enough effort to increase his heart rate 4 days per week.   He is taking medications regularly.             Had H pylori treated last year.  Out of famotidine.    Back pain.  Does not lift.  Occasional tylenol.    No snoring or apnea.    Current Outpatient Medications   Medication Sig Dispense Refill    acetaminophen (TYLENOL) 500 MG tablet Take 2 tablets (1,000 mg) by mouth 2 times daily as needed 100 tablet 11    cyclobenzaprine (FLEXERIL) 5 MG tablet Take 1-2 tablets (5-10 mg) by mouth nightly as needed for muscle spasms 30 tablet 3    famotidine (PEPCID) 20 MG tablet Take 1 tablet (20 mg) by mouth 2 times daily 60 tablet 11             Objective    /67   Pulse 82   Temp 99  F (37.2  C) (Oral)   Resp 18   Ht 1.588 m (5' 2.5\")   Wt 45.4 kg (100 lb 0.6 oz)   SpO2 98%   BMI 18.01 kg/m    Body mass index is 18.01 kg/m .  Physical Exam     Heart normal  Lungs " normal  Abdomen normal  Bilateral trapezius tenderness          Signed Electronically by: Drew Estes MD

## 2024-04-05 NOTE — LETTER
May 28, 2024      Paul Fulton  [address] [place] [address]        Hi Kyo,    Your H pylori test was normal.    Resulted Orders   Helicobacter pylori Antigen Stool   Result Value Ref Range    Helicobacter pylori Antigen Stool Negative Negative      Comment:      Negative for Helicobacter pylori antigen by enzyme immunoassay. A negative result indicates the absence of H. pylori antigen or that the level of antigen is below the level of detection.       If you have any questions or concerns, please call the clinic at the number listed above.       Sincerely,      Drew Estes MD

## 2024-04-09 PROCEDURE — 87338 HPYLORI STOOL AG IA: CPT | Performed by: FAMILY MEDICINE

## 2024-04-11 LAB — H PYLORI AG STL QL IA: NEGATIVE

## 2024-09-02 ENCOUNTER — TELEPHONE (OUTPATIENT)
Dept: FAMILY MEDICINE | Facility: CLINIC | Age: 29
End: 2024-09-02
Payer: COMMERCIAL

## 2024-09-02 NOTE — TELEPHONE ENCOUNTER
Reason for Call:  Appointment Request    Patient requesting this type of appt: Procedure: vasectomy    Requested provider: Drew Estes    Reason patient unable to be scheduled: Needs to be scheduled by clinic    When does patient want to be seen/preferred time:  ASAP    Comments: Patient is wanting vasectomy     Okay to leave a detailed message?: Yes at Other phone number:  [phone]*    Call taken on 9/2/2024 at 9:03 AM by Margie Joseph

## 2024-09-06 ENCOUNTER — OFFICE VISIT (OUTPATIENT)
Dept: FAMILY MEDICINE | Facility: CLINIC | Age: 29
End: 2024-09-06
Payer: COMMERCIAL

## 2024-09-06 VITALS
BODY MASS INDEX: 17.76 KG/M2 | SYSTOLIC BLOOD PRESSURE: 117 MMHG | TEMPERATURE: 98.1 F | RESPIRATION RATE: 16 BRPM | HEIGHT: 63 IN | OXYGEN SATURATION: 98 % | DIASTOLIC BLOOD PRESSURE: 74 MMHG | HEART RATE: 63 BPM | WEIGHT: 100.25 LBS

## 2024-09-06 DIAGNOSIS — Z30.09 STERILIZATION EDUCATION: Primary | ICD-10-CM

## 2024-09-06 DIAGNOSIS — M25.511 RIGHT SHOULDER PAIN, UNSPECIFIED CHRONICITY: ICD-10-CM

## 2024-09-06 PROCEDURE — 99213 OFFICE O/P EST LOW 20 MIN: CPT | Performed by: FAMILY MEDICINE

## 2024-09-06 NOTE — PROGRESS NOTES
"  Assessment & Plan     Sterilization education    I explained procedure, considered permanent.  Tiny chance of vas reconnecting.    - Adult Urology  Referral    Right shoulder pain, unspecified chronicity    He will try heat on the shoulder.                  Mata Miller is a 29 year old, presenting for the following health issues:  Vasectomy      9/6/2024    12:07 PM   Additional Questions   Roomed by Wilmar MIRANDA   Accompanied by n/a     History of Present Illness       Reason for visit:  Vasectomy   He is taking medications regularly.             Has 8-year-old, 2-year-old, and 1-week-old.  Wishes vasectomy.    Sore right shoulder.  No injury.  Massage helps.    Current Outpatient Medications   Medication Sig Dispense Refill    acetaminophen (TYLENOL) 500 MG tablet Take 2 tablets (1,000 mg) by mouth 2 times daily as needed (Patient not taking: Reported on 9/6/2024) 100 tablet 11    cyclobenzaprine (FLEXERIL) 5 MG tablet Take 1-2 tablets (5-10 mg) by mouth nightly as needed for muscle spasms (Patient not taking: Reported on 9/6/2024) 30 tablet 3    famotidine (PEPCID) 20 MG tablet Take 1 tablet (20 mg) by mouth 2 times daily (Patient not taking: Reported on 9/6/2024) 60 tablet 11           Objective    /74   Pulse 63   Temp 98.1  F (36.7  C) (Oral)   Resp 16   Ht 1.59 m (5' 2.6\")   Wt 45.5 kg (100 lb 4 oz)   SpO2 98%   BMI 17.99 kg/m    Body mass index is 17.99 kg/m .  Physical Exam     Heart normal  Lungs normal  Right trapezius tender          Signed Electronically by: Drew Estes MD    "

## 2024-12-24 ENCOUNTER — OFFICE VISIT (OUTPATIENT)
Dept: URGENT CARE | Facility: URGENT CARE | Age: 29
End: 2024-12-24
Payer: COMMERCIAL

## 2024-12-24 VITALS
DIASTOLIC BLOOD PRESSURE: 68 MMHG | WEIGHT: 99.4 LBS | TEMPERATURE: 98.1 F | SYSTOLIC BLOOD PRESSURE: 103 MMHG | BODY MASS INDEX: 17.83 KG/M2 | RESPIRATION RATE: 18 BRPM | OXYGEN SATURATION: 99 % | HEART RATE: 70 BPM

## 2024-12-24 DIAGNOSIS — L50.9 URTICARIA: Primary | ICD-10-CM

## 2024-12-24 PROCEDURE — 99213 OFFICE O/P EST LOW 20 MIN: CPT | Performed by: PHYSICIAN ASSISTANT

## 2024-12-24 RX ORDER — CETIRIZINE HYDROCHLORIDE 10 MG/1
10 TABLET ORAL DAILY
Qty: 30 TABLET | Refills: 1 | Status: SHIPPED | OUTPATIENT
Start: 2024-12-24

## 2024-12-24 RX ORDER — METHYLPREDNISOLONE 4 MG/1
TABLET ORAL
Qty: 21 TABLET | Refills: 0 | Status: SHIPPED | OUTPATIENT
Start: 2024-12-24

## 2024-12-24 NOTE — PROGRESS NOTES
Patient presents with:  Derm Problem: X1 month, Patient reports the rash started in the middle of arm between bicep & forearm. He states now he is itchy from head to toe. Intermittent, red, welt like spots.       (L50.9) Urticaria  (primary encounter diagnosis)  Comment:   Plan: cetirizine (ZYRTEC) 10 MG tablet,         methylPREDNISolone (MEDROL DOSEPAK) 4 MG tablet        therapy pack    Take cetirizine every night for the next month.            Follow up with your primary doctor should symptoms persist or worsen.      At the end of the encounter, I discussed results, diagnosis, medications. Discussed red flags for immediate return to clinic/ER, as well as indications for follow up if no improvement. Patient understood and agreed to plan. Patient was stable for discharge         SUBJECTIVE:   Paul Fulton is a 29 year old male who presents today with intermittent itchy skin and rashes.  He shows a photograph which reveals obvious urticarial lesions on his forearm.  He states that the rash comes and goes.  Occasionally he itches from head to toe.  Denies any new soaps lotions or foods or medications.  He is otherwise in his usual state of health.    In clinic today he is without a rash.    Patient Active Problem List   Diagnosis   (none) - all problems resolved or deleted         Past Medical History:   Diagnosis Date    Open displaced fracture of middle phalanx of right ring finger 10/13/2017    Overview:  Added automatically from request for surgery [id number]     Right Inguinal Hernia     Created by [employer]version [employer] Utility updated for latest IMO load          Current Outpatient Medications   Medication Sig Dispense Refill    Multiple Vitamins-Iron (DAILY-LIAM/IRON/BETA-CAROTENE) TABS TAKE 1 TABLET BY MOUTH DAILY. (Patient not taking: Reported on 10/19/2020) 30 tablet 7     Social History     Tobacco Use    Smoking status: Never Smoker    Smokeless tobacco: Never Used   Substance Use Topics    Alcohol use: Not on  file     Family History   Problem Relation Age of Onset    Diabetes Mother     Diabetes Father          ROS:    10 point ROS of systems including Constitutional, Eyes, Respiratory, Cardiovascular, Gastroenterology, Genitourinary, Integumentary, Muscularskeletal, Psychiatric ,neurological were all negative except for pertinent positives noted in my HPI       OBJECTIVE:  /68 (BP Location: Right arm, Patient Position: Sitting, Cuff Size: Adult Regular)   Pulse 70   Temp 98.1  F (36.7  C) (Oral)   Resp 18   Wt 45.1 kg (99 lb 6.4 oz)   SpO2 99%   BMI 17.83 kg/m    Physical Exam:  GENERAL APPEARANCE: healthy, alert and no distress  HENT: ear canals and TM's normal.  Nose and mouth without ulcers, erythema or lesions  NECK: supple, nontender, no lymphadenopathy  RESP: lungs clear to auscultation - no rales, rhonchi or wheezes  CV: regular rates and rhythm, normal S1 S2, no murmur noted  SKIN: no suspicious lesions or rashes

## 2024-12-24 NOTE — PATIENT INSTRUCTIONS
(L50.9) Urticaria  (primary encounter diagnosis)  Comment:   Plan: cetirizine (ZYRTEC) 10 MG tablet,         methylPREDNISolone (MEDROL DOSEPAK) 4 MG tablet        therapy pack    Take cetirizine every night for the next month.            Follow up with your primary doctor should symptoms persist or worsen.

## 2025-03-13 ENCOUNTER — TELEPHONE (OUTPATIENT)
Dept: FAMILY MEDICINE | Facility: CLINIC | Age: 30
End: 2025-03-13
Payer: COMMERCIAL

## 2025-03-13 NOTE — TELEPHONE ENCOUNTER
S-(situation): Patient's wife Janet (not on CTC) calls. Writer obtained verbal consent from patient to discuss patient's care.     B-(background): Patient was seen in urgent care for urticaria 12/24/24 and prescribed cetirizine and methylprednisolone. Rash resolved but has not returned.     A-(assessment): Red rash for 2 weeks. States the rash improved while he was on medication so he would like to try the medication again.     R-(recommendations): Advised that this may need an appointment. Patient would like a message sent to PCP to ask if medications can be sent to [facility] [place] on [address] [address] in [place]. Patient does not have active mychart to do an e-visit.

## 2025-03-13 NOTE — TELEPHONE ENCOUNTER
"Dr. Estes is not in clinic this week.   =====================================================================  Writer attempt #1 to call patient with the help of an [facility] \"Stefania\"   regarding clinician's message below.     Informed patient that the medications requested for refill was prescribed for a short/acute problem. If patient continues to have symptoms, he needs to be seen again.    Patient states he has no insurance right now, so asking for a refill. Offer to schedule an appointment, but patient declines at this time. Informed patient that he can buy the Zyrtec OTC, but for the other medication, he needs to be seen to get it re prescribed if appropriate.    NEHEMIAH LeeN, RN, PHN   [facility]    "